# Patient Record
(demographics unavailable — no encounter records)

---

## 2025-03-17 NOTE — HISTORY OF PRESENT ILLNESS
[FreeTextEntry1] : This telehealth visit was conducted via two-way audio and video technology.  The patient, BRIGIDO SANTAMARIA, was located at

## 2025-04-01 NOTE — HISTORY OF PRESENT ILLNESS
[Patient reported PAP Smear was normal] : Patient reported PAP Smear was normal [N] : Patient reports normal menses [Y] : Positive pregnancy history [Menarche Age: ____] : age at menarche was [unfilled] [Currently Active] : currently active [Men] : men [No] : No [Yes] : Yes [Patient refuses STI testing] : Patient refuses STI testing [TextBox_19] : None [TextBox_25] : None [PapSmeardate] : 2024 [TextBox_37] : None [TextBox_43] : None [LMPDate] : 03/09/25 [MensesFreq] : 28 [MensesLength] : 5 [PGxTotal] : 6 [[facility] [TextBox_6] : 03/09/25 [TextBox_9] : 12 [FreeTextEntry1] : Painful [FreeTextEntry3] : IUD Kyleena

## 2025-04-01 NOTE — HISTORY OF PRESENT ILLNESS
[Patient reported PAP Smear was normal] : Patient reported PAP Smear was normal [N] : Patient reports normal menses [Y] : Positive pregnancy history [Menarche Age: ____] : age at menarche was [unfilled] [Currently Active] : currently active [Men] : men [No] : No [Yes] : Yes [Patient refuses STI testing] : Patient refuses STI testing [TextBox_19] : None [TextBox_25] : None [PapSmeardate] : 2024 [TextBox_37] : None [TextBox_43] : None [LMPDate] : 03/09/25 [MensesFreq] : 28 [MensesLength] : 5 [PGxTotal] : 6 [[facility] [TextBox_6] : 03/09/25 [TextBox_9] : 12 [FreeTextEntry1] : Painful [FreeTextEntry3] : IUD Kyleena

## 2025-05-28 NOTE — HISTORY OF PRESENT ILLNESS
[FreeTextEntry1] : 34 yo patient presents for LEFT Bartholin Cyst that ruptured. It returned and then ruptured on own around 5/18 or 5/19.  Had dark discharge and fever/chills at the time which have now resolved. Pt felt pain and itchiness after rupture.  Denies dysuria.